# Patient Record
Sex: FEMALE | URBAN - METROPOLITAN AREA
[De-identification: names, ages, dates, MRNs, and addresses within clinical notes are randomized per-mention and may not be internally consistent; named-entity substitution may affect disease eponyms.]

---

## 2023-08-03 ENCOUNTER — EMERGENCY (EMERGENCY)
Facility: HOSPITAL | Age: 43
LOS: 1 days | Discharge: HOME | End: 2023-08-03
Attending: EMERGENCY MEDICINE
Payer: MEDICAID

## 2023-08-03 VITALS
HEART RATE: 76 BPM | WEIGHT: 147.05 LBS | DIASTOLIC BLOOD PRESSURE: 90 MMHG | RESPIRATION RATE: 18 BRPM | TEMPERATURE: 98 F | OXYGEN SATURATION: 98 % | HEIGHT: 59 IN | SYSTOLIC BLOOD PRESSURE: 137 MMHG

## 2023-08-03 DIAGNOSIS — Z98.51 TUBAL LIGATION STATUS: Chronic | ICD-10-CM

## 2023-08-03 DIAGNOSIS — Z90.49 ACQUIRED ABSENCE OF OTHER SPECIFIED PARTS OF DIGESTIVE TRACT: Chronic | ICD-10-CM

## 2023-08-03 LAB
ALBUMIN SERPL ELPH-MCNC: 4.8 G/DL — SIGNIFICANT CHANGE UP (ref 3.3–5)
ALP SERPL-CCNC: 113 U/L — SIGNIFICANT CHANGE UP (ref 40–120)
ALT FLD-CCNC: 45 U/L — SIGNIFICANT CHANGE UP (ref 10–45)
ANION GAP SERPL CALC-SCNC: 15 MMOL/L — SIGNIFICANT CHANGE UP (ref 5–17)
APPEARANCE UR: CLEAR — SIGNIFICANT CHANGE UP
AST SERPL-CCNC: 27 U/L — SIGNIFICANT CHANGE UP (ref 10–40)
BASOPHILS # BLD AUTO: 0.05 K/UL — SIGNIFICANT CHANGE UP (ref 0–0.2)
BASOPHILS NFR BLD AUTO: 0.6 % — SIGNIFICANT CHANGE UP (ref 0–2)
BILIRUB SERPL-MCNC: 0.5 MG/DL — SIGNIFICANT CHANGE UP (ref 0.2–1.2)
BILIRUB UR-MCNC: NEGATIVE — SIGNIFICANT CHANGE UP
BUN SERPL-MCNC: 8 MG/DL — SIGNIFICANT CHANGE UP (ref 7–23)
CALCIUM SERPL-MCNC: 9.9 MG/DL — SIGNIFICANT CHANGE UP (ref 8.4–10.5)
CHLORIDE SERPL-SCNC: 100 MMOL/L — SIGNIFICANT CHANGE UP (ref 96–108)
CO2 SERPL-SCNC: 23 MMOL/L — SIGNIFICANT CHANGE UP (ref 22–31)
COLOR SPEC: COLORLESS — SIGNIFICANT CHANGE UP
CREAT SERPL-MCNC: 0.58 MG/DL — SIGNIFICANT CHANGE UP (ref 0.5–1.3)
DIFF PNL FLD: NEGATIVE — SIGNIFICANT CHANGE UP
EGFR: 117 ML/MIN/1.73M2 — SIGNIFICANT CHANGE UP
EOSINOPHIL # BLD AUTO: 0.26 K/UL — SIGNIFICANT CHANGE UP (ref 0–0.5)
EOSINOPHIL NFR BLD AUTO: 3.2 % — SIGNIFICANT CHANGE UP (ref 0–6)
GLUCOSE SERPL-MCNC: 96 MG/DL — SIGNIFICANT CHANGE UP (ref 70–99)
GLUCOSE UR QL: NEGATIVE — SIGNIFICANT CHANGE UP
HCT VFR BLD CALC: 39.1 % — SIGNIFICANT CHANGE UP (ref 34.5–45)
HGB BLD-MCNC: 13 G/DL — SIGNIFICANT CHANGE UP (ref 11.5–15.5)
IMM GRANULOCYTES NFR BLD AUTO: 0.5 % — SIGNIFICANT CHANGE UP (ref 0–0.9)
KETONES UR-MCNC: NEGATIVE — SIGNIFICANT CHANGE UP
LEUKOCYTE ESTERASE UR-ACNC: NEGATIVE — SIGNIFICANT CHANGE UP
LIDOCAIN IGE QN: 19 U/L — SIGNIFICANT CHANGE UP (ref 7–60)
LYMPHOCYTES # BLD AUTO: 2.65 K/UL — SIGNIFICANT CHANGE UP (ref 1–3.3)
LYMPHOCYTES # BLD AUTO: 32.6 % — SIGNIFICANT CHANGE UP (ref 13–44)
MCHC RBC-ENTMCNC: 30.7 PG — SIGNIFICANT CHANGE UP (ref 27–34)
MCHC RBC-ENTMCNC: 33.2 GM/DL — SIGNIFICANT CHANGE UP (ref 32–36)
MCV RBC AUTO: 92.2 FL — SIGNIFICANT CHANGE UP (ref 80–100)
MONOCYTES # BLD AUTO: 0.6 K/UL — SIGNIFICANT CHANGE UP (ref 0–0.9)
MONOCYTES NFR BLD AUTO: 7.4 % — SIGNIFICANT CHANGE UP (ref 2–14)
NEUTROPHILS # BLD AUTO: 4.52 K/UL — SIGNIFICANT CHANGE UP (ref 1.8–7.4)
NEUTROPHILS NFR BLD AUTO: 55.7 % — SIGNIFICANT CHANGE UP (ref 43–77)
NITRITE UR-MCNC: NEGATIVE — SIGNIFICANT CHANGE UP
NRBC # BLD: 0 /100 WBCS — SIGNIFICANT CHANGE UP (ref 0–0)
PH UR: 7.5 — SIGNIFICANT CHANGE UP (ref 5–8)
PLATELET # BLD AUTO: 450 K/UL — HIGH (ref 150–400)
POTASSIUM SERPL-MCNC: 3.8 MMOL/L — SIGNIFICANT CHANGE UP (ref 3.5–5.3)
POTASSIUM SERPL-SCNC: 3.8 MMOL/L — SIGNIFICANT CHANGE UP (ref 3.5–5.3)
PROT SERPL-MCNC: 8 G/DL — SIGNIFICANT CHANGE UP (ref 6–8.3)
PROT UR-MCNC: NEGATIVE — SIGNIFICANT CHANGE UP
RBC # BLD: 4.24 M/UL — SIGNIFICANT CHANGE UP (ref 3.8–5.2)
RBC # FLD: 12.5 % — SIGNIFICANT CHANGE UP (ref 10.3–14.5)
SODIUM SERPL-SCNC: 138 MMOL/L — SIGNIFICANT CHANGE UP (ref 135–145)
SP GR SPEC: 1 — LOW (ref 1.01–1.02)
UROBILINOGEN FLD QL: NEGATIVE — SIGNIFICANT CHANGE UP
WBC # BLD: 8.12 K/UL — SIGNIFICANT CHANGE UP (ref 3.8–10.5)
WBC # FLD AUTO: 8.12 K/UL — SIGNIFICANT CHANGE UP (ref 3.8–10.5)

## 2023-08-03 PROCEDURE — 74177 CT ABD & PELVIS W/CONTRAST: CPT | Mod: 26,MA

## 2023-08-03 PROCEDURE — 99285 EMERGENCY DEPT VISIT HI MDM: CPT

## 2023-08-03 PROCEDURE — 71045 X-RAY EXAM CHEST 1 VIEW: CPT | Mod: 26

## 2023-08-03 PROCEDURE — 99283 EMERGENCY DEPT VISIT LOW MDM: CPT

## 2023-08-03 RX ORDER — MORPHINE SULFATE 50 MG/1
4 CAPSULE, EXTENDED RELEASE ORAL ONCE
Refills: 0 | Status: DISCONTINUED | OUTPATIENT
Start: 2023-08-03 | End: 2023-08-03

## 2023-08-03 RX ORDER — SODIUM CHLORIDE 9 MG/ML
1000 INJECTION INTRAMUSCULAR; INTRAVENOUS; SUBCUTANEOUS ONCE
Refills: 0 | Status: COMPLETED | OUTPATIENT
Start: 2023-08-03 | End: 2023-08-03

## 2023-08-03 RX ADMIN — SODIUM CHLORIDE 1000 MILLILITER(S): 9 INJECTION INTRAMUSCULAR; INTRAVENOUS; SUBCUTANEOUS at 22:11

## 2023-08-03 NOTE — ED PROVIDER NOTE - PATIENT PORTAL LINK FT
You can access the FollowMyHealth Patient Portal offered by [facility] by registering at the following website: [email or web address]. By joining [employer]’s FollowMyHealth portal, you will also be able to view your health information using other applications (apps) compatible with our system.

## 2023-08-03 NOTE — ED PROVIDER NOTE - PROGRESS NOTE DETAILS
Endorsed to Dr Leonor Whitehead MD, Facep Attending MD Galvez: Received call from surgery, no acute surgical intervention, recommend po challenge, dispo as per ED Attending MD Glavez: Patient re-evaluated by surgery and by this MD.  Tolerating po.  +tenderness to abdomen, no rebound, no guarding.  Has not had analgesic in many hours, will give TYlenol, Motrin and dc.  Reviewed labs and radiology with patient.  Stable for discharge. Follow up instructions given, importance of follow up emphasized, return to ED parameters reviewed and patient verbalized understanding.  All questions answered, all concerns addressed.

## 2023-08-03 NOTE — ED ADULT NURSE NOTE - OBJECTIVE STATEMENT
43 y/o F with PMH of HTN, appendicitis and tubal ligation. Pt presents to the ED c/o RLQ pain. Pt reports she had an appendectomy on 7/12 and began having pain on Tuesday. Pt reports pain has been worsening since and is worse upon movement/ambulation. Pt endorses decreased PO intake and pain on urination. Pt reports chills which began today. Pt reports taking Tylenol around 12 pm-1 pm with minimal relief. Denies chest pain, SOB, n/v/d, lightheadedness, dizziness. Upon assessment, respirations unlabored, abdomen soft, tender to palpation, no redness or swelling noted on RLQ incision site. IV access established R 20 AC. Patient safety and comfort measures implemented.

## 2023-08-03 NOTE — ED PROVIDER NOTE - CLINICAL SUMMARY MEDICAL DECISION MAKING FREE TEXT BOX
Adult female psh appy 7/12 now pw two days of rlq pain tenderness. Concerns for surg complication, check ct, labs, ua reassess  Alex Whitehead MD, Facep

## 2023-08-03 NOTE — ED ADULT NURSE NOTE - NSFALLUNIVINTERV_ED_ALL_ED
Bed/Stretcher in lowest position, wheels locked, appropriate side rails in place/Call bell, personal items and telephone in reach/Physically safe environment - no spills, clutter or unnecessary equipment/Purposeful proactive rounding

## 2023-08-03 NOTE — ED PROVIDER NOTE - NSFOLLOWUPINSTRUCTIONS_ED_ALL_ED_FT
YOU WERE SEEN IN THE ED FOR: abdominal pain    WHILE YOU WERE HERE, YOU HAD: lab work and a CT scan which showed post surgical changes    FOR PAIN, YOU MAY TAKE TYLENOL (ACETAMINOPHEN) AND/OR IBUPROFEN (Advil or Motrin). FOLLOW THE INSTRUCTIONS ON THE LABEL/CONTAINER.  DO NOT EXCEED 4000MG OF TYLENOL (ACETAMINOPHEN) IN A 24 HOUR PERIOD.  YOU WERE GIVEN A DOSE OF TYLENOL 1000MG AND TORADOL 15MG SHORTLY BEFORE DISCHARGE.    PLEASE FOLLOW UP WITH YOUR ORIGINAL SURGEON WITHIN THE NEXT 24-72 HOURS. BRING COPIES OF YOUR RESULTS.  IF YOU ARE UNABLE TO FOLLOW UP WITH THAT SURGEON, YOU CAN CALL TO SET UP AN APPOINTMENT WITH DR. NORRIS AT THE NUMBER BELOW.    RETURN TO THE EMERGENCY DEPARTMENT IF YOU EXPERIENCE ANY NEW/CONCERNING/WORSENING SYMPTOMS SUCH AS BUT NOT LIMITED TO: chest pain, shortness of breath, severe abdominal pain, persistent vomiting, loss of urinary or bowel continence, difficulty urinating (changes in bowel or bladder control),  increasing pain in any area of your body, blood in your urine, stool, or vomit,  or any other concern.

## 2023-08-03 NOTE — ED PROVIDER NOTE - ATTENDING APP SHARED VISIT CONTRIBUTION OF CARE
Private Physician [facility] Isr[facility] PCP [place] 42[facility] female pmh Htn, Covid, Covid vaccine. PSH Tubal ligation no dm,hld,habits,travel,cva,cad. PT sp appy [facility] 7/12. Now comes to ed c/o abd pain rlq onset two days ago . Worsening, Pain 8/10 worse w ambulation, Anorexia w/o nausea and vomiting. PE WDWN feamle awake alert normocephalic atraumatic neck supple chest clear anterior & posterior cv no rubs, gallops or murmurs abd +ttp rlq mod w recent surg scars/ecchymosis. neruo no focal defects  lAex Whitehead MD, Facep Private Physician [facility] PCP [place] 42y female pmh Htn, Covid, Covid vaccine. PSH Tubal ligation no dm,hld,habits,travel,cva,cad. [place] [facility] [place] Now comes to ed c/o abd pain rlq onset two days ago . Worsening, Pain 8/10 worse w ambulation, Anorexia w/o nausea and vomiting. PE WDWN female awake alert normocephalic atraumatic neck supple chest clear anterior & posterior cv no rubs, gallops or murmurs abd +ttp rlq mod w recent surg scars/ecchymosis. neuro no focal defects.  Alex Whitehead MD, Facep

## 2023-08-03 NOTE — ED PROVIDER NOTE - CARE PROVIDER_API CALL
Prieto Dominique  Surgery  [address] [place] [address], [address]  [place] [address]  Phone: [phone]  Fax: [phone]  Follow Up Time: 1-3 Days

## 2023-08-03 NOTE — ED PROVIDER NOTE - PHYSICAL EXAMINATION
female awake alert normocephalic atraumatic neck supple chest clear anterior & posterior cv no rubs, gallops or murmurs abd +ttp rlq mod w recent surg scars/ecchymosis. neuro no focal defects.  Alex Whitehead MD, Facep

## 2023-08-03 NOTE — ED PROVIDER NOTE - SHIFT CHANGE DETAILS
Attending MD Galvez: 42F s/p recent appy outside hospital, here w RLQ abd pain, no WBC, no fever, CT with post surgical changes, pending sx consult, dispo as per surgery

## 2023-08-03 NOTE — ED PROVIDER NOTE - OBJECTIVE STATEMENT
Private Physician [facility] PCP [place] 42y female pmh Htn, Covid, Covid vaccine. PSH Tubal ligation no dm,hld,habits,travel,cva,cad. PT sp appy [facility] 7/12. Now comes to ed c/o abd pain rlq onset two days ago . Worsening, Pain 8/10 worse w ambulation, Anorexia w/o nausea and vomiting.

## 2023-08-04 VITALS
DIASTOLIC BLOOD PRESSURE: 71 MMHG | SYSTOLIC BLOOD PRESSURE: 107 MMHG | OXYGEN SATURATION: 97 % | RESPIRATION RATE: 16 BRPM | TEMPERATURE: 98 F | HEART RATE: 61 BPM

## 2023-08-04 PROCEDURE — 80053 COMPREHEN METABOLIC PANEL: CPT

## 2023-08-04 PROCEDURE — 96365 THER/PROPH/DIAG IV INF INIT: CPT | Mod: XU

## 2023-08-04 PROCEDURE — 96361 HYDRATE IV INFUSION ADD-ON: CPT

## 2023-08-04 PROCEDURE — 93005 ELECTROCARDIOGRAM TRACING: CPT

## 2023-08-04 PROCEDURE — 71045 X-RAY EXAM CHEST 1 VIEW: CPT

## 2023-08-04 PROCEDURE — 83690 ASSAY OF LIPASE: CPT

## 2023-08-04 PROCEDURE — 96375 TX/PRO/DX INJ NEW DRUG ADDON: CPT

## 2023-08-04 PROCEDURE — 99285 EMERGENCY DEPT VISIT HI MDM: CPT | Mod: 25

## 2023-08-04 PROCEDURE — 81003 URINALYSIS AUTO W/O SCOPE: CPT

## 2023-08-04 PROCEDURE — 36415 COLL VENOUS BLD VENIPUNCTURE: CPT

## 2023-08-04 PROCEDURE — 85025 COMPLETE CBC W/AUTO DIFF WBC: CPT

## 2023-08-04 PROCEDURE — 74177 CT ABD & PELVIS W/CONTRAST: CPT | Mod: MA

## 2023-08-04 RX ORDER — KETOROLAC TROMETHAMINE 30 MG/ML
15 SYRINGE (ML) INJECTION ONCE
Refills: 0 | Status: DISCONTINUED | OUTPATIENT
Start: 2023-08-04 | End: 2023-08-04

## 2023-08-04 RX ORDER — ACETAMINOPHEN 500 MG
1000 TABLET ORAL ONCE
Refills: 0 | Status: COMPLETED | OUTPATIENT
Start: 2023-08-04 | End: 2023-08-04

## 2023-08-04 RX ADMIN — MORPHINE SULFATE 4 MILLIGRAM(S): 50 CAPSULE, EXTENDED RELEASE ORAL at 02:49

## 2023-08-04 RX ADMIN — Medication 400 MILLIGRAM(S): at 02:18

## 2023-08-04 RX ADMIN — Medication 1000 MILLIGRAM(S): at 02:49

## 2023-08-04 RX ADMIN — Medication 15 MILLIGRAM(S): at 02:18

## 2023-08-04 RX ADMIN — Medication 15 MILLIGRAM(S): at 02:49

## 2023-08-04 RX ADMIN — MORPHINE SULFATE 4 MILLIGRAM(S): 50 CAPSULE, EXTENDED RELEASE ORAL at 00:32

## 2023-08-04 RX ADMIN — SODIUM CHLORIDE 1000 MILLILITER(S): 9 INJECTION INTRAMUSCULAR; INTRAVENOUS; SUBCUTANEOUS at 00:55

## 2023-08-04 NOTE — CONSULT NOTE ADULT - SUBJECTIVE AND OBJECTIVE BOX
General Surgery Consult  Consulting surgical team: [facility]  Consulting attending: Nereyda    HPI:  42F with a recent history of appendectomy at [facility] three weeks ago, presenting with right nneka-abdominal pain. Denies nausea, vomiting, fevers, hematochezia     PAST MEDICAL HISTORY:  HTN (hypertension)      PAST SURGICAL HISTORY:  S/P appendectomy    S/P tubal ligation        MEDICATIONS:      ALLERGIES:  No Known Allergies      VITALS & I/Os:  Vital Signs Last 24 Hrs  T(C): 36.8 (03 Aug 2023 22:40), Max: 36.8 (03 Aug 2023 22:40)  T(F): 98.2 (03 Aug 2023 22:40), Max: 98.2 (03 Aug 2023 22:40)  HR: 67 (04 Aug 2023 00:30) (67 - 76)  BP: 123/78 (04 Aug 2023 00:30) (120/80 - 137/90)  BP(mean): 93 (04 Aug 2023 00:30) (93 - 93)  RR: 18 (04 Aug 2023 00:30) (18 - 20)  SpO2: 98% (04 Aug 2023 00:30) (98% - 100%)    Parameters below as of 04 Aug 2023 00:30  Patient On (Oxygen Delivery Method): room air        I&O's Summary      PHYSICAL EXAM:  General: No acute distress  Respiratory: Nonlabored  Cardiovascular: RRR  Abdominal: Soft, nondistended, rt-hemiabdominal tenderness. No rebound or guarding. No organomegaly, no palpable mass. Well healed surgical scars  Extremities: Warm    LABS:                        13.0   8.12  )-----------( 450      ( 03 Aug 2023 22:14 )             39.1     08-    138  |  100  |  8   ----------------------------<  96  3.8   |  23  |  0.58    Ca    9.9      03 Aug 2023 22:14    TPro  8.0  /  Alb  4.8  /  TBili  0.5  /  DBili  x   /  AST  27  /  ALT  45  /  AlkPhos  113  08-03    Lactate:              Urinalysis Basic - ( 03 Aug 2023 23:23 )    Color: Colorless / Appearance: Clear / S.004 / pH: x  Gluc: x / Ketone: Negative  / Bili: Negative / Urobili: Negative   Blood: x / Protein: Negative / Nitrite: Negative   Leuk Esterase: Negative / RBC: x / WBC x   Sq Epi: x / Non Sq Epi: x / Bacteria: x        IMAGING:

## 2023-08-04 NOTE — CONSULT NOTE ADULT - ASSESSMENT
42F s/p lap appy at [facility] three weeks ago presenting with abdominal pain. CTAP without any acute intraabdominal pathology. WBC wnl. Afebrile.    Recommendations:  - No acute surgical intervention at this time.   - PO challenge. If tolerates, no contraindication to d/c from surgery's perspective.    Discussed with ACS fellow.    ACS  [id number]

## 2023-11-22 NOTE — ED ADULT NURSE NOTE - IN THE PAST 12 MONTHS HAVE YOU USED DRUGS OTHER THAN THOSE REQUIRED FOR MEDICAL REASON?
No Quality 431: Preventive Care And Screening: Unhealthy Alcohol Use - Screening: Patient not identified as an unhealthy alcohol user when screened for unhealthy alcohol use using a systematic screening method Additional Notes: Not specified Quality 110: Preventive Care And Screening: Influenza Immunization: Influenza Immunization not Administered for Documented Reasons. Quality 226: Preventive Care And Screening: Tobacco Use: Screening And Cessation Intervention: Patient screened for tobacco use and is an ex/non-smoker Detail Level: Detailed Quality 130: Documentation Of Current Medications In The Medical Record: Current Medications Documented

## 2025-03-04 NOTE — ED PROVIDER NOTE - TIMING
Provider: [phone]    Caller: Phyllis Iyer    Relationship to Patient: Self    Pharmacy: [facility]     Phone Number:  [phone]    Reason for Call: PT STATES HER BP THIS MORNING /99 AND SHE IS DIZZY AND SHAKING.  ADVISED PT TO GO TO ED.  SHE STATES SHE WANTS TO TALK TO THE PROVIDER TO SEE IF SHE WILL CHANGE HER  BP MEDICATION.  PT STATES SHE HAD A STROKE THREE WEEKS AGO    WILL W/T TO OFFICE     PLEASE REVIEW AND ADVISE     THANK YOU      gradual onset